# Patient Record
Sex: MALE | Race: WHITE | NOT HISPANIC OR LATINO | URBAN - METROPOLITAN AREA
[De-identification: names, ages, dates, MRNs, and addresses within clinical notes are randomized per-mention and may not be internally consistent; named-entity substitution may affect disease eponyms.]

---

## 2017-01-01 ENCOUNTER — AMBULATORY - HEALTHEAST (OUTPATIENT)
Dept: ADMINISTRATIVE | Facility: CLINIC | Age: 47
End: 2017-01-01

## 2017-01-01 ENCOUNTER — OFFICE VISIT - HEALTHEAST (OUTPATIENT)
Dept: GERIATRICS | Facility: CLINIC | Age: 47
End: 2017-01-01

## 2017-01-01 DIAGNOSIS — R62.7 FTT (FAILURE TO THRIVE) IN ADULT: ICD-10-CM

## 2017-01-01 DIAGNOSIS — N28.9 KIDNEY DISEASE: ICD-10-CM

## 2017-01-01 DIAGNOSIS — C22.1 CHOLANGIOCARCINOMA (H): ICD-10-CM

## 2017-01-01 DIAGNOSIS — R18.8 ASCITES: ICD-10-CM

## 2017-01-01 RX ORDER — ONDANSETRON 4 MG/1
4 TABLET, ORALLY DISINTEGRATING ORAL EVERY 6 HOURS PRN
Status: SHIPPED | COMMUNITY
Start: 2017-01-01

## 2017-01-01 RX ORDER — ATROPINE SULFATE 10 MG/ML
2 SOLUTION/ DROPS OPHTHALMIC
Status: SHIPPED | COMMUNITY
Start: 2017-01-01

## 2017-01-01 RX ORDER — BISACODYL 10 MG
10 SUPPOSITORY, RECTAL RECTAL DAILY PRN
Status: SHIPPED | COMMUNITY
Start: 2017-01-01

## 2017-01-01 RX ORDER — OXYCODONE HCL 20 MG/ML
5 CONCENTRATE, ORAL ORAL
Status: SHIPPED | COMMUNITY
Start: 2017-01-01

## 2017-01-01 RX ORDER — ALBUTEROL SULFATE 90 UG/1
2 AEROSOL, METERED RESPIRATORY (INHALATION) EVERY 4 HOURS PRN
Status: SHIPPED | COMMUNITY
Start: 2017-01-01

## 2017-01-01 RX ORDER — LACTULOSE 10 G/15ML
30 SOLUTION ORAL 2 TIMES DAILY
Status: SHIPPED | COMMUNITY
Start: 2017-01-01

## 2017-01-01 RX ORDER — ACETAMINOPHEN 650 MG/1
650 SUPPOSITORY RECTAL EVERY 6 HOURS PRN
Status: SHIPPED | COMMUNITY
Start: 2017-01-01

## 2017-01-01 RX ORDER — PROCHLORPERAZINE MALEATE 10 MG
10 TABLET ORAL EVERY 6 HOURS PRN
Status: SHIPPED | COMMUNITY
Start: 2017-01-01

## 2017-01-01 RX ORDER — AMOXICILLIN 250 MG
1 CAPSULE ORAL DAILY PRN
Status: SHIPPED | COMMUNITY
Start: 2017-01-01

## 2017-05-09 ENCOUNTER — AMBULATORY - HEALTHEAST (OUTPATIENT)
Dept: GERIATRICS | Facility: CLINIC | Age: 47
End: 2017-05-09

## 2021-06-10 NOTE — PROGRESS NOTES
[facility]      Code Status:  DNR/DNI  Visit Type: H & P     Facility:  [facility] [[id number]]           History of Present Illness: Benedicto Brunner  is a 46 y.o. male who is admitted from [facility] on Hospice  Patient is significantly confused and most of the history was obtained from his significant other who was present at his bedside.  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  He was admitted in the hospital to start palliative chemotherapy on 4/24/17.  He has had difficulty tolerating his chemo and became acute shortly short of breath.  He was also noted to be hypoxic and tachycardic with distended abdomen.  Labs were stable except for thrombocytopenia hyponatremia hyperkalemia elevated INR is abnormal liver function tests TSH of 9.8 and TPO antibodies elevated at 31.3  He was admitted for ultrasound-guided paracentesis and 5 L of fluid was removed oncology consultation was obtained and they started inpatient chemotherapy for him.  Unfortunately he did not tolerated very well and developed hyponatremia which was felt to be multifactorial related to SIADH and hyperkalemia.  He was given hypertonic saline infusion his sodium eventually dropped to 124 spite of all his treatments.  His other labs also showed an increase in potassium to 5.4 and abnormal other labs and with worsening confusion goals of care treatment was held it was felt that he was not responsive to chemo and it was felt that he would benefit from being on hospice  Fused and is on hospice currently in the TCU  Past Medical History:   Diagnosis Date      Ascites due to alcoholic cirrhosis      Cholangiocarcinoma metastatic to liver      Hydrocephalus      Hyperkalemia      Hyponatremia      Hypoxemia      Liver mass, left lobe      Lung metastases      Obesity      Secondary esophageal varices without bleeding      SOB (shortness of breath)      Thrombocytopenia       (ventriculoperitoneal) shunt status      Past Surgical History:   Procedure Laterality Date     VENTRICULOPERITONEAL SHUNT       Family History   Problem Relation Age of Onset     Pulmonary Hypertension Mother      Heart disease Father      Prostate cancer Father      Lymphoma Maternal Grandmother      Heart disease Maternal Grandfather      Social History     Social History     Marital status: Single     Spouse name: N/A     Number of children: N/A     Years of education: N/A     Occupational History     Not on file.     Social History Main Topics     Smoking status: Current Every Day Smoker     Packs/day: 1.00     Years: 20.00     Types: Cigarettes     Smokeless tobacco: Never Used     Alcohol use Yes      Comment: not daily      Drug use: No     Sexual activity: Not on file     Other Topics Concern     Not on file     Social History Narrative     No narrative on file     Current Outpatient Prescriptions   Medication Sig Dispense Refill     acetaminophen (TYLENOL) 650 MG suppository Insert 650 mg into the rectum every 6 (six) hours as needed for fever.       albuterol (PROAIR HFA;PROVENTIL HFA;VENTOLIN HFA) 90 mcg/actuation inhaler Inhale 2 puffs every 4 (four) hours as needed.       atropine 1 % ophthalmic solution Place 2 drops under the tongue every 2 (two) hours as needed.       bisacodyl (DULCOLAX) 10 mg suppository Insert 10 mg into the rectum daily as needed.       lactulose 10 gram/15 mL solution Take 30 mL by mouth 2 (two) times a day. May take an additional 30 mL by mouth as needed daily for constipation.       ondansetron (ZOFRAN-ODT) 4 MG disintegrating tablet Place 4 mg under the  tongue every 6 (six) hours as needed.       oxyCODONE (ROXICODONE) 20 mg/mL concentrated solution Take 5 mg by mouth every hour as needed.       prochlorperazine (COMPAZINE) 10 MG tablet Take 10 mg by mouth every 6 (six) hours as needed for nausea.       senna-docusate (SENNOSIDES-DOCUSATE SODIUM) 8.6-50 mg tablet Take 1 tablet by mouth daily as needed for constipation.       No current facility-administered medications for this visit.      Allergies   Allergen Reactions     Erythromycin Hives     had reaction as a child.          Review of Systems:    Constitutional: Negative.  Negative for fever, chills,has activity change, appetite change and fatigue.   HENT: Negative for congestion and facial swelling.    Eyes: Negative for photophobia, redness and visual disturbance.   Respiratory: Negative for cough and chest tightness.    Cardiovascular: Negative for chest pain, palpitations and leg swelling.   Gastrointestinal: Negative for nausea, diarrhea, constipation, blood in stool and abdominal distention.   Genitourinary: Negative.    Musculoskeletal: Negative.    Skin: Negative.    Neurological: Negative for dizziness, tremors, syncope,has  weakness, light-headedness and headaches.   Hematological: Does not bruise/bleed easily.   Psychiatric/Behavioral: Negative.      Vitals:    05/04/17 1454   BP: 102/67   Pulse: 72   Resp: 19   Temp: 98  F (36.7  C)       Physical Exam:    GENERAL: no acute distress. Cooperative in conversation.  Patient is somnolent but does answer questions however he is confused  HEENT: pupils are equal, round and reactive. Oral mucosa is moist and intact.  RESP:Chest symmetric. Regular respiratory rate. No stridor.  CVS: S1S2  ABD: Nondistended, soft.  Distended abdomen with ascites  EXTREMITIES: No lower extremity edema.  weak and can barely move his legs  NEURO: non focal. Alert and oriented xself.   PSYCH: within normal limits. No depression or anxiety.  SKIN: warm dry intact   Labs:    No  results found.    Assessment/Plan:    Metastatic cholangiocarcinoma with metastases to the lungs bone and lymph node.  Patient has been discharged on hospice care and as he did not tolerate palliative chemotherapy.  Renal failure with hyperkalemia and hyponatremia.  Recurrent ascites.  Confusion with altered mental status.  Failure to thrive.,  Toxic respiratory failure currently on oxygen  Patient has not been doing very well.  Spite of his advanced disease he was given a trial of palliative chemotherapy but did not respond very well to it with progressive renal failure confusion and electrolyte abnormalities.  After discussing with family a decision to move him to comfort care focus on hospice was made and he has been shifted to TCU plans are for him to stay here he is already on a comfort care package and remains very confused abdomen remains distended he is eating poorly confused and very weak.  I did have a detailed discussion with the hospice nurse as well as his significant other and I think he is actively starting the process of dying he is pretty confused and goals of care were discussed with them family is comfortable with their decision.  Since he has started the active process of dying prognosis remains guarded and family was explained about that the death is imminent so  Total time spent was 45 minutes, more than half of it was in face-to-face counseling regarding disease state, treatment, side effects, documentation, review of clinical data and coordination of care    Electronically signed by: ALCIRA Murrell  This progress note was completed using Dragon software and there may be grammatical errors.